# Patient Record
Sex: FEMALE | Race: WHITE
[De-identification: names, ages, dates, MRNs, and addresses within clinical notes are randomized per-mention and may not be internally consistent; named-entity substitution may affect disease eponyms.]

---

## 2021-02-12 ENCOUNTER — HOSPITAL ENCOUNTER (OUTPATIENT)
Dept: HOSPITAL 47 - LABWHC1 | Age: 46
Discharge: HOME | End: 2021-02-12
Attending: OBSTETRICS & GYNECOLOGY
Payer: COMMERCIAL

## 2021-02-12 DIAGNOSIS — Z13.29: ICD-10-CM

## 2021-02-12 DIAGNOSIS — U07.1: Primary | ICD-10-CM

## 2021-02-12 DIAGNOSIS — Z13.220: ICD-10-CM

## 2021-02-12 LAB
CHOLEST SERPL-MCNC: 124 MG/DL (ref 0–200)
GLUCOSE SERPL-MCNC: 85 MG/DL (ref 70–110)
HDLC SERPL-MCNC: 62 MG/DL (ref 40–60)
T4 FREE SERPL-MCNC: 1.1 NG/DL (ref 0.8–1.8)
TRIGL SERPL-MCNC: <50 MG/DL (ref 0–149)

## 2021-02-12 PROCEDURE — 36415 COLL VENOUS BLD VENIPUNCTURE: CPT

## 2021-02-12 PROCEDURE — 80061 LIPID PANEL: CPT

## 2021-02-12 PROCEDURE — 84443 ASSAY THYROID STIM HORMONE: CPT

## 2021-02-12 PROCEDURE — 82947 ASSAY GLUCOSE BLOOD QUANT: CPT

## 2021-02-12 PROCEDURE — 86769 SARS-COV-2 COVID-19 ANTIBODY: CPT

## 2021-02-12 PROCEDURE — 84481 FREE ASSAY (FT-3): CPT

## 2021-02-12 PROCEDURE — 84439 ASSAY OF FREE THYROXINE: CPT

## 2021-09-17 ENCOUNTER — HOSPITAL ENCOUNTER (OUTPATIENT)
Dept: HOSPITAL 47 - LABWHC1 | Age: 46
Discharge: HOME | End: 2021-09-17
Attending: OBSTETRICS & GYNECOLOGY
Payer: COMMERCIAL

## 2021-09-17 DIAGNOSIS — U07.1: Primary | ICD-10-CM

## 2021-09-17 PROCEDURE — 86769 SARS-COV-2 COVID-19 ANTIBODY: CPT

## 2021-09-17 PROCEDURE — 36415 COLL VENOUS BLD VENIPUNCTURE: CPT

## 2023-07-03 ENCOUNTER — HOSPITAL ENCOUNTER (OUTPATIENT)
Dept: HOSPITAL 47 - RADUSWWP | Age: 48
Discharge: HOME | End: 2023-07-03
Attending: OBSTETRICS & GYNECOLOGY
Payer: COMMERCIAL

## 2023-07-03 DIAGNOSIS — Z98.82: ICD-10-CM

## 2023-07-03 DIAGNOSIS — N63.20: Primary | ICD-10-CM

## 2023-07-03 NOTE — USB
Reason for Exam: Clinical finding. 





Risk Values: 

[employer]ore 5 year model risk: 0.6%.

[employer]CI Lifetime model risk: 6.3%.

Technique: 

Method: Whole Breast Handheld.  





Findings: 

The whole breast of the left breast, the axilla of the left breast and the retroareolar of the left

breast were scanned.

There is a complex cyst present at the palpable abnormality 9 cm nipple approximately 3:00 position.

This measures 3.7 x 1.9 x 3.7 cm. Multiple internal echoes which are mobile under real-time

observation are observed. This does come in close approximation with the implant. However, direct

communication could not be visualized. Real-time observation was performed. This does not appear to

be in the from the implant. If closer evaluation is required MRI could evaluate.



Due to the complex nature and the recent appearance of this structure, consider cyst aspiration for

additional evaluation. 





Overall Assessment: Suspicious, BI-RAD 4





Management: 

Aspiration of the left breast.

A clinical breast exam by your physician is recommended on an annual basis and results should be

correlated with mammographic findings.  This exam should not preclude additional follow-up of

suspicious palpable abnormalities. Results were given to the patient verbally at the time of exam.



Electronically signed and approved by: Joshua Cisse D.O. Radiologis